# Patient Record
Sex: MALE | Race: WHITE | Employment: FULL TIME | URBAN - METROPOLITAN AREA
[De-identification: names, ages, dates, MRNs, and addresses within clinical notes are randomized per-mention and may not be internally consistent; named-entity substitution may affect disease eponyms.]

---

## 2020-06-11 ENCOUNTER — APPOINTMENT (OUTPATIENT)
Dept: URGENT CARE | Facility: URGENT CARE | Age: 25
End: 2020-06-11
Payer: COMMERCIAL

## 2020-06-11 ENCOUNTER — RESULTS ONLY (OUTPATIENT)
Dept: LAB | Age: 25
End: 2020-06-11

## 2020-06-11 LAB
SARS-COV-2 RNA SPEC QL NAA+PROBE: NOT DETECTED
SPECIMEN SOURCE: NORMAL

## 2020-12-04 ENCOUNTER — ANCILLARY PROCEDURE (OUTPATIENT)
Dept: GENERAL RADIOLOGY | Facility: CLINIC | Age: 25
End: 2020-12-04
Attending: FAMILY MEDICINE
Payer: COMMERCIAL

## 2020-12-04 ENCOUNTER — TELEPHONE (OUTPATIENT)
Dept: FAMILY MEDICINE | Facility: CLINIC | Age: 25
End: 2020-12-04

## 2020-12-04 ENCOUNTER — OFFICE VISIT (OUTPATIENT)
Dept: FAMILY MEDICINE | Facility: CLINIC | Age: 25
End: 2020-12-04
Payer: COMMERCIAL

## 2020-12-04 VITALS
TEMPERATURE: 97.3 F | HEART RATE: 64 BPM | DIASTOLIC BLOOD PRESSURE: 68 MMHG | SYSTOLIC BLOOD PRESSURE: 128 MMHG | RESPIRATION RATE: 16 BRPM | OXYGEN SATURATION: 98 % | WEIGHT: 189 LBS

## 2020-12-04 DIAGNOSIS — R76.11 POSITIVE PURIFIED PROTEIN DERIVATIVE (PPD) SKIN TEST WITH NEGATIVE CHEST X-RAY: ICD-10-CM

## 2020-12-04 DIAGNOSIS — R76.11 MANTOUX: POSITIVE: ICD-10-CM

## 2020-12-04 DIAGNOSIS — Z22.7 LATENT TUBERCULOSIS BY SKIN TEST: Primary | ICD-10-CM

## 2020-12-04 PROCEDURE — 71046 X-RAY EXAM CHEST 2 VIEWS: CPT | Mod: FY | Performed by: RADIOLOGY

## 2020-12-04 PROCEDURE — 99203 OFFICE O/P NEW LOW 30 MIN: CPT | Performed by: INTERNAL MEDICINE

## 2020-12-04 NOTE — TELEPHONE ENCOUNTER
We have done the letter as requested. Please fax to the concerned.    Thank you,  Dahlia Child MD on 12/4/2020 at 3:39 PM

## 2020-12-04 NOTE — LETTER
[address] [address]  Phone: [phone]    December 4, 2020        Connor Palacio  [address] [facility] [address]          To whom it may concern:    RE: Connor Alcaraz was seen by me in our Clinic today for positive PPD test. His Chest X ray is negative for any active TB at this leslie.    Please contact me for questions or concerns.      Sincerely,        Dahlia Child MD

## 2020-12-04 NOTE — PATIENT INSTRUCTIONS
Please do the lab work given for baseline liver function before starting the treatment for your Latent TB. As opted by you om our discussion we will await for you to call me back if you are wanting 3 months regimen or 6 months regimen of treatment. Depending on that we will send the medication to your pharmacy.       =========================

## 2020-12-04 NOTE — LETTER
December 4, 2020      Connor Palacio  [address]  [facility] [address]        Dear ,    We are writing to inform you of your test results.          Your X ray is normal, no concerns per radiology review.          Resulted Orders   XR Chest 2 Views    Narrative    CHEST TWO VIEWS  12/4/2020 10:43 AM     HISTORY: 25-year-old patient with positive mantoux test.    COMPARISON: None      Impression    IMPRESSION: Heart size is normal. No pleural effusion, pneumothorax,  or abnormal area of consolidation. No suggestion of hilar or right  paratracheal lymphadenopathy. No acute osseous abnormality.    ASHLY KAHN MD       If you have any questions or concerns, please call the clinic at the number listed above.       Sincerely,      Sumeet Diehl MD

## 2020-12-04 NOTE — ASSESSMENT & PLAN NOTE
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 Has the report of PPD test which patient has gotten the    Pt did have previous positive test of PPD skin test when he says he never got treated with medication at that time. Discussed on the possible need of checking the titers and also the risk of his exposure to TB patients at [facility] which patient is agreeable on getting the treatment.  "

## 2020-12-04 NOTE — TELEPHONE ENCOUNTER
Pt needs letter for his [facility].    Please say in letter:    Results for chest xray and whether it was positive or negative for ACTIVE TB    Fax to:  F: [phone]  Attn: Babs Hayes

## 2020-12-04 NOTE — PROGRESS NOTES
Subjective     Connor Palacio is a 25 year old male who presents to clinic today for the following health issues:    HPI         Concern - positive mantoux  Reading was this morning    Then had a chest xray here in clinic     No Known Allergies     History reviewed. No pertinent past medical history.    History reviewed. No pertinent surgical history.    History reviewed. No pertinent family history.    Social History     Tobacco Use     Smoking status: Never Smoker     Smokeless tobacco: Never Used   Substance Use Topics     Alcohol use: Yes        No current outpatient medications on file.     No current facility-administered medications for this visit.         Review of Systems   Constitutional, HEENT, cardiovascular, pulmonary, GI, , musculoskeletal, neuro, skin, endocrine and psych systems are negative, except as otherwise noted.      Objective    /68   Pulse 64   Temp 97.3  F (36.3  C) (Tympanic)   Resp 16   Wt 85.7 kg (189 lb)   SpO2 98%   There is no height or weight on file to calculate BMI.  Physical Exam   GENERAL: healthy, alert and no distress  NECK: no adenopathy, no asymmetry, masses, or scars and thyroid normal to palpation  RESP: lungs clear to auscultation - no rales, rhonchi or wheezes  CV: regular rate and rhythm, normal S1 S2, no S3 or S4, no murmur, click or rub, no peripheral edema and peripheral pulses strong  ABDOMEN: soft, nontender, no hepatosplenomegaly, no masses and bowel sounds normal  MS: no gross musculoskeletal defects noted, no edema    Labs and imaging reviewed and discussed with the patient.        Assessment and Plan  1. 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 Discussed with the patient more than 20 minutes on various options and answered all his questions with new guidelines.   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   - Offered the options of 3 months and 6 months regimens which patient will let us know what he is going to choose, await for him to let us know before we prescribe the medications.  - Will need repeat lab work of LFTs explained to follow up on LFTs which patient understood the plan.  - CBC with platelets differential; Future  - Comprehensive metabolic panel; Future       Patient Instructions   Please do the lab work given for baseline liver function before starting the treatment for your Latent TB. As opted by you om our discussion we will await for you to call me back if you are wanting 3 months regimen or 6 months regimen of treatment. Depending on that we will send the medication to your pharmacy.       =========================        Return in about 3 months (around 3/4/2021), or if symptoms worsen or fail to improve, for Follow up of last visit.    Dahlia Child MD  [facility]

## 2020-12-04 NOTE — RESULT ENCOUNTER NOTE
Your X ray is normal, no concerns per radiology review.    Please let me know if you have any questions.  Dahlia Child MD on 12/4/2020 at 3:57 PM

## 2020-12-11 ENCOUNTER — RESULTS ONLY (OUTPATIENT)
Dept: LAB | Age: 25
End: 2020-12-11

## 2020-12-11 ENCOUNTER — OFFICE VISIT (OUTPATIENT)
Dept: URGENT CARE | Facility: URGENT CARE | Age: 25
End: 2020-12-11
Payer: COMMERCIAL

## 2020-12-11 DIAGNOSIS — Z53.9 ERRONEOUS ENCOUNTER--DISREGARD: Primary | ICD-10-CM

## 2020-12-11 LAB
SARS-COV-2 RNA SPEC QL NAA+PROBE: NORMAL
SPECIMEN SOURCE: NORMAL

## 2020-12-12 LAB
LABORATORY COMMENT REPORT: ABNORMAL
SARS-COV-2 RNA SPEC QL NAA+PROBE: POSITIVE
SPECIMEN SOURCE: ABNORMAL

## 2021-05-09 ENCOUNTER — HEALTH MAINTENANCE LETTER (OUTPATIENT)
Age: 26
End: 2021-05-09

## 2021-10-24 ENCOUNTER — HEALTH MAINTENANCE LETTER (OUTPATIENT)
Age: 26
End: 2021-10-24

## 2022-06-05 ENCOUNTER — HEALTH MAINTENANCE LETTER (OUTPATIENT)
Age: 27
End: 2022-06-05

## 2022-10-15 ENCOUNTER — HEALTH MAINTENANCE LETTER (OUTPATIENT)
Age: 27
End: 2022-10-15

## 2023-06-11 ENCOUNTER — HEALTH MAINTENANCE LETTER (OUTPATIENT)
Age: 28
End: 2023-06-11